# Patient Record
Sex: FEMALE | URBAN - METROPOLITAN AREA
[De-identification: names, ages, dates, MRNs, and addresses within clinical notes are randomized per-mention and may not be internally consistent; named-entity substitution may affect disease eponyms.]

---

## 2021-05-05 ENCOUNTER — APPOINTMENT (OUTPATIENT)
Age: 31
Setting detail: DERMATOLOGY
End: 2021-05-05

## 2021-05-05 VITALS — TEMPERATURE: 97.7 F

## 2021-05-05 DIAGNOSIS — D22 MELANOCYTIC NEVI: ICD-10-CM

## 2021-05-05 DIAGNOSIS — L82.1 OTHER SEBORRHEIC KERATOSIS: ICD-10-CM

## 2021-05-05 PROBLEM — D22.5 MELANOCYTIC NEVI OF TRUNK: Status: ACTIVE | Noted: 2021-05-05

## 2021-05-05 PROCEDURE — OTHER MIPS QUALITY: OTHER

## 2021-05-05 PROCEDURE — 11300 SHAVE SKIN LESION 0.5 CM/<: CPT

## 2021-05-05 PROCEDURE — OTHER SHAVE REMOVAL: OTHER

## 2021-05-05 PROCEDURE — 99203 OFFICE O/P NEW LOW 30 MIN: CPT | Mod: 25

## 2021-05-05 PROCEDURE — OTHER REASSURANCE: OTHER

## 2021-05-05 PROCEDURE — OTHER TREATMENT REGIMEN: OTHER

## 2021-05-05 PROCEDURE — OTHER FOLLOW UP FOR NEXT VISIT: OTHER

## 2021-05-05 PROCEDURE — OTHER COUNSELING: OTHER

## 2021-05-05 ASSESSMENT — LOCATION DETAILED DESCRIPTION DERM
LOCATION DETAILED: RIGHT SUPERIOR LATERAL MIDBACK
LOCATION DETAILED: INFERIOR THORACIC SPINE
LOCATION DETAILED: RIGHT LATERAL SHOULDER
LOCATION DETAILED: SUPERIOR LUMBAR SPINE

## 2021-05-05 ASSESSMENT — LOCATION SIMPLE DESCRIPTION DERM
LOCATION SIMPLE: UPPER BACK
LOCATION SIMPLE: LOWER BACK
LOCATION SIMPLE: RIGHT SHOULDER
LOCATION SIMPLE: RIGHT LOWER BACK

## 2021-05-05 ASSESSMENT — LOCATION ZONE DERM
LOCATION ZONE: ARM
LOCATION ZONE: TRUNK

## 2021-05-05 ASSESSMENT — PAIN INTENSITY VAS: HOW INTENSE IS YOUR PAIN 0 BEING NO PAIN, 10 BEING THE MOST SEVERE PAIN POSSIBLE?: NO PAIN

## 2021-05-05 NOTE — PROCEDURE: FOLLOW UP FOR NEXT VISIT
Detail Level: Simple
Scheduled For Follow Up In (Optional): 3-6 months
Scheduled For Follow Up In (Optional): prn

## 2021-05-05 NOTE — HPI: SKIN LESIONS
How Severe Is Your Skin Lesion?: mild
Have Your Skin Lesions Been Treated?: not been treated
Is This A New Presentation, Or A Follow-Up?: Skin Lesions
Additional History: Patient has a raised lesion on her right shoulder that is rough and slightly annoying but otherwise asymptomatic. She has a mole on her mid-lower back that she thinks might have grown in size and changed in color. She is here today to determine if there are any lesions that are abnormal. She has no previous history of pre-cancer, skin cancer or melanoma. Of note she just delivered a baby through  two weeks ago and is still breast-feeding

## 2021-05-05 NOTE — PROCEDURE: TREATMENT REGIMEN
Plan: Patient reassured that the lesion on the right shoulder is consistent with either a slightly irritated seborrheic keratosis or other benign keratosis. We did discuss the possibility of electrodesiccation and curettage but she is just going to monitor and if the lesion doesn’t go away she will follow up at some later date
Plan: Based on the appearance and the multifocal pigmentation we did elect to proceed with shave removal. Follow up will be based on the results. The remainder of the moles examined on the back appear normal however I do recommend patient follow up in 3 to 6 months for a full body skin check
Detail Level: Simple

## 2021-05-05 NOTE — PROCEDURE: SHAVE REMOVAL
X Size Of Lesion In Cm (Optional): 0.4
Anesthesia Volume In Cc: 0.6
Render Path Notes In Note?: Yes
Post-Care Instructions: I reviewed with the patient in detail post-care instructions. Patient is to keep the biopsy site dry overnight, and then apply bacitracin twice daily until healed. Patient may apply hydrogen peroxide soaks to remove any crusting.
Consent was obtained from the patient. The risks and benefits to therapy were discussed in detail. Specifically, the risks of infection, scarring, bleeding, prolonged wound healing, incomplete removal, allergy to anesthesia, nerve injury and recurrence were addressed. Prior to the procedure, the treatment site was clearly identified and confirmed by the patient. All components of Universal Protocol/PAUSE Rule completed.
Medical Necessity Information: It is in your best interest to select a reason for this procedure from the list below. All of these items fulfill various CMS LCD requirements except the new and changing color options.
Size Of Lesion In Cm (Required): 0.5
Detail Level: Detailed
Wound Care: Polysporin ointment
Billing Type: Third-Party Bill
Add Variable For Additional Medical Justification: No
Biopsy Method: Dermablade
Path Notes (To The Dermatopathologist): Atypical nevus, please evaluate
Hemostasis: Electrocautery
Size Of Margin In Cm (Margins Are Not Added To Billing Dimensions): 0
Anesthesia Type: 1% Xylocaine with epinephrine
Medical Necessity Clause: This procedure was medically necessary because the lesion that was treated was: changing color, suspicious for malignancy
Notification Instructions: Patient will be notified of biopsy results. However, patient instructed to call the office if not contacted within 2 weeks.

## 2021-05-05 NOTE — PROCEDURE: REASSURANCE
Detail Level: Zone
Include Location In Plan?: Yes
Additional Note: Normal basic exam of the trunk. The moles examined on the back are normal. Patient will follow up in 3 to 6 months for a good comprehensive skin exam
Hide Include Location In Plan Question?: No

## 2021-12-13 ENCOUNTER — APPOINTMENT (OUTPATIENT)
Age: 31
Setting detail: DERMATOLOGY
End: 2021-12-13

## 2021-12-13 DIAGNOSIS — L82.0 INFLAMED SEBORRHEIC KERATOSIS: ICD-10-CM

## 2021-12-13 DIAGNOSIS — L57.8 OTHER SKIN CHANGES DUE TO CHRONIC EXPOSURE TO NONIONIZING RADIATION: ICD-10-CM

## 2021-12-13 PROCEDURE — 17110 DESTRUCT B9 LESION 1-14: CPT

## 2021-12-13 PROCEDURE — OTHER COUNSELING: OTHER

## 2021-12-13 PROCEDURE — OTHER REASSURANCE: OTHER

## 2021-12-13 PROCEDURE — OTHER LIQUID NITROGEN: OTHER

## 2021-12-13 PROCEDURE — OTHER FOLLOW UP FOR NEXT VISIT: OTHER

## 2021-12-13 PROCEDURE — 99212 OFFICE O/P EST SF 10 MIN: CPT | Mod: 25

## 2021-12-13 PROCEDURE — OTHER TREATMENT REGIMEN: OTHER

## 2021-12-13 ASSESSMENT — LOCATION DETAILED DESCRIPTION DERM
LOCATION DETAILED: RIGHT CENTRAL MALAR CHEEK
LOCATION DETAILED: LEFT CENTRAL MALAR CHEEK
LOCATION DETAILED: RIGHT SUPERIOR MEDIAL MALAR CHEEK

## 2021-12-13 ASSESSMENT — LOCATION SIMPLE DESCRIPTION DERM
LOCATION SIMPLE: LEFT CHEEK
LOCATION SIMPLE: RIGHT CHEEK

## 2021-12-13 ASSESSMENT — LOCATION ZONE DERM: LOCATION ZONE: FACE

## 2021-12-13 ASSESSMENT — PAIN INTENSITY VAS: HOW INTENSE IS YOUR PAIN 0 BEING NO PAIN, 10 BEING THE MOST SEVERE PAIN POSSIBLE?: NO PAIN

## 2021-12-13 NOTE — PROCEDURE: LIQUID NITROGEN
Render Post-Care Instructions In Note?: yes
Duration Of Freeze Thaw-Cycle (Seconds): 5
Include Z78.9 (Other Specified Conditions Influencing Health Status) As An Associated Diagnosis?: No
Application Tool (Optional): Cry-AC
Detail Level: Detailed
Post-Care Instructions: I reviewed with the patient in detail post-care instructions. Patient is to wear sunprotection, and avoid picking at any of the treated lesions. Pt may apply Vaseline to crusted or scabbing areas.
Consent: The patient's consent was obtained including but not limited to risks of crusting, scabbing, blistering, scarring, darker or lighter pigmentary change, recurrence, incomplete removal and infection.
Medical Necessity Information: It is in your best interest to select a reason for this procedure from the list below. All of these items fulfill various CMS LCD requirements except the new and changing color options.
Aperture Size (Optional): C
Number Of Freeze-Thaw Cycles: 1 freeze-thaw cycle
Medical Necessity Clause: This procedure was medically necessary because the lesions that were treated were: enlarging and irritated

## 2021-12-13 NOTE — HPI: SKIN LESION
What Type Of Note Output Would You Prefer (Optional)?: Bullet Format
How Severe Is Your Skin Lesion?: moderate
Has Your Skin Lesion Been Treated?: not been treated
Is This A New Presentation, Or A Follow-Up?: Skin Lesion
Additional History: Patient is here for evaluation of a lesion on the right cheek that she has noticed for about a week. She reports picking at it and the lesion is not painful, itchy, or bleeding but it does not seem to heal.  Currently the lesion is not irritated but she wanted to make sure it didn’t represent anything dangerous

## 2021-12-13 NOTE — PROCEDURE: REASSURANCE
Detail Level: Zone
Hide Additional Notes?: No
Additional Note: Patient reassured areas appear benign and no treatment needed. Follow up if areas change or become painful, otherwise follow up annually.
Include Location In Plan?: Yes

## 2021-12-13 NOTE — PROCEDURE: TREATMENT REGIMEN
Detail Level: Detailed
Samples Given: Serica x1
Plan: Normal basic exam of the face. I did recommend patient follow up in the next 3 to 6 months for a full body skin check as she’s never had a comprehensive skin exam
Plan: Patient reassured this appears to be consistent with a benign, irritated seborrheic keratosis. Like cryotherapy applied which should lead to resolution. If not follow up. Otherwise follow up in 3 to 6 months for her annual full body skin exam
Initiate Treatment: LN
Detail Level: Zone

## 2023-06-15 ENCOUNTER — APPOINTMENT (OUTPATIENT)
Age: 33
Setting detail: DERMATOLOGY
End: 2023-06-15

## 2023-06-15 DIAGNOSIS — L30.9 DERMATITIS, UNSPECIFIED: ICD-10-CM

## 2023-06-15 DIAGNOSIS — L91.8 OTHER HYPERTROPHIC DISORDERS OF THE SKIN: ICD-10-CM

## 2023-06-15 PROCEDURE — OTHER TREATMENT REGIMEN: OTHER

## 2023-06-15 PROCEDURE — OTHER FOLLOW UP FOR NEXT VISIT: OTHER

## 2023-06-15 PROCEDURE — OTHER PRESCRIPTION: OTHER

## 2023-06-15 PROCEDURE — 99213 OFFICE O/P EST LOW 20 MIN: CPT

## 2023-06-15 PROCEDURE — OTHER REASSURANCE: OTHER

## 2023-06-15 PROCEDURE — OTHER COUNSELING: OTHER

## 2023-06-15 RX ORDER — CLOBETASOL PROPIONATE 0.5 MG/G
THIN COAT OINTMENT TOPICAL BID
Qty: 15 | Refills: 0 | Status: ERX | COMMUNITY
Start: 2023-06-15

## 2023-06-15 ASSESSMENT — SEVERITY ASSESSMENT: SEVERITY: MODERATE

## 2023-06-15 ASSESSMENT — PAIN INTENSITY VAS: HOW INTENSE IS YOUR PAIN 0 BEING NO PAIN, 10 BEING THE MOST SEVERE PAIN POSSIBLE?: NO PAIN

## 2023-06-15 ASSESSMENT — LOCATION SIMPLE DESCRIPTION DERM
LOCATION SIMPLE: RIGHT THIGH
LOCATION SIMPLE: LEFT AXILLARY VAULT
LOCATION SIMPLE: RIGHT AXILLARY VAULT
LOCATION SIMPLE: LEFT AXILLARY VAULT
LOCATION SIMPLE: RIGHT AXILLARY VAULT

## 2023-06-15 ASSESSMENT — LOCATION DETAILED DESCRIPTION DERM
LOCATION DETAILED: RIGHT ANTERIOR MEDIAL PROXIMAL THIGH
LOCATION DETAILED: RIGHT AXILLARY VAULT
LOCATION DETAILED: LEFT AXILLARY VAULT
LOCATION DETAILED: RIGHT AXILLARY VAULT
LOCATION DETAILED: LEFT AXILLARY VAULT

## 2023-06-15 ASSESSMENT — LOCATION ZONE DERM
LOCATION ZONE: AXILLAE
LOCATION ZONE: LEG
LOCATION ZONE: AXILLAE

## 2023-06-15 ASSESSMENT — ITCH NUMERIC RATING SCALE: HOW SEVERE IS YOUR ITCHING?: 4

## 2023-06-15 ASSESSMENT — BSA RASH: BSA RASH: 2

## 2023-06-15 NOTE — PROCEDURE: REASSURANCE
Hide Additional Notes?: No
Detail Level: Detailed
Include Location In Plan?: Yes
Additional Note: Reassured patient lesion is benign.  Cosmetic removal will be $70 for snip removal.

## 2023-06-15 NOTE — HPI: RASH
What Type Of Note Output Would You Prefer (Optional)?: Bullet Format
Is The Patient Presenting As Previously Scheduled?: Yes
How Severe Is Your Rash?: moderate
Is This A New Presentation, Or A Follow-Up?: Rash
Additional History: Patient presents to have a rash evaluated on her right and left axilla areas.

## 2023-06-15 NOTE — PROCEDURE: TREATMENT REGIMEN
Plan: Disappears consistent with some form of irritant or possible contact dermatitis. Topical steroid twice daily for one to two weeks then as needed. She will call or follow up if this does not improve, if this worsens, or if this continues to reoccur.
Detail Level: Simple
Initiate Treatment: Clobetasol ointment

## 2024-03-12 ENCOUNTER — APPOINTMENT (OUTPATIENT)
Age: 34
Setting detail: DERMATOLOGY
End: 2024-03-12

## 2024-03-12 NOTE — HPI: BODY LOCATION - ARM
How Severe Is Your Condition?: moderate
Additional History: Patient presents for a rash
Year Removed: 1900

## 2024-03-18 ENCOUNTER — APPOINTMENT (OUTPATIENT)
Age: 34
Setting detail: DERMATOLOGY
End: 2024-03-18

## 2024-10-02 ENCOUNTER — APPOINTMENT (OUTPATIENT)
Age: 34
Setting detail: DERMATOLOGY
End: 2024-10-02

## 2024-10-02 DIAGNOSIS — D22 MELANOCYTIC NEVI: ICD-10-CM

## 2024-10-02 PROBLEM — D22.61 MELANOCYTIC NEVI OF RIGHT UPPER LIMB, INCLUDING SHOULDER: Status: ACTIVE | Noted: 2024-10-02

## 2024-10-02 PROCEDURE — OTHER TREATMENT REGIMEN: OTHER

## 2024-10-02 PROCEDURE — OTHER COUNSELING: OTHER

## 2024-10-02 PROCEDURE — OTHER SHAVE REMOVAL (NO PATHOLOGY): OTHER

## 2024-10-02 PROCEDURE — OTHER MIPS QUALITY: OTHER

## 2024-10-02 PROCEDURE — OTHER FOLLOW UP FOR NEXT VISIT: OTHER

## 2024-10-02 PROCEDURE — 11300 SHAVE SKIN LESION 0.5 CM/<: CPT

## 2024-10-02 ASSESSMENT — LOCATION SIMPLE DESCRIPTION DERM: LOCATION SIMPLE: RIGHT AXILLARY VAULT

## 2024-10-02 ASSESSMENT — LOCATION ZONE DERM: LOCATION ZONE: AXILLAE

## 2024-10-02 ASSESSMENT — LOCATION DETAILED DESCRIPTION DERM: LOCATION DETAILED: RIGHT AXILLARY VAULT

## 2024-10-02 NOTE — HPI: EVALUATION OF SKIN LESION(S)
What Type Of Note Output Would You Prefer (Optional)?: Bullet Format
How Severe Are Your Spot(S)?: moderate
Have Your Spot(S) Been Treated In The Past?: has not been treated
Hpi Title: Evaluation of a Skin Lesion
Additional History: Patient presents for a spot check. Patient has a raised, slightly irritated lesion in her right armpit that she’s noticed for the past several weeks. She thought it was a pimple and tried to pop it, but it won’t go away. She also has some other skin tags she might like treated or removed in the future.

## 2024-10-02 NOTE — PROCEDURE: SHAVE REMOVAL (NO PATHOLOGY)
Medical Necessity Information: It is in your best interest to select a reason for this procedure from the list below. All of these items fulfill various CMS LCD requirements except the new and changing color options.
Include Z78.9 (Other Specified Conditions Influencing Health Status) As An Associated Diagnosis?: No
Medical Necessity Clause: This procedure was medically necessary because the lesion that was treated was:
Detail Level: Detailed
Size Of Lesion In Cm: 0.4
Size Of Margin In Cm: 0
Anesthesia Type: 1% Polocaine
Hemostasis: Electrocautery
Wound Care: Polysporin ointment
Consent was obtained from the patient. The risks and benefits to therapy were discussed in detail. Specifically, the risks of infection, scarring, bleeding, prolonged wound healing, incomplete removal, allergy to anesthesia, nerve injury and recurrence were addressed. Prior to the procedure, the treatment site was clearly identified and confirmed by the patient. All components of Universal Protocol/PAUSE Rule completed.
Render Post-Care Instructions In Note?: yes
Post-Care Instructions: I reviewed with the patient in detail post-care instructions. Patient is to keep the biopsy site dry overnight, and then apply bacitracin twice daily until healed. Patient may apply hydrogen peroxide soaks to remove any crusting.

## 2024-10-02 NOTE — PROCEDURE: TREATMENT REGIMEN
Plan: Patient reassured this is benign, it is irritated so we did proceed with shave removal. Post treatment care instructions were given to the patient. She will follow up if needed. Regarding the other small skin tags between the breasts and on the right leg, she will follow up at some point in the future for cosmetic removal.
Detail Level: Detailed